# Patient Record
Sex: FEMALE | Race: WHITE
[De-identification: names, ages, dates, MRNs, and addresses within clinical notes are randomized per-mention and may not be internally consistent; named-entity substitution may affect disease eponyms.]

---

## 2019-07-10 ENCOUNTER — HOSPITAL ENCOUNTER (EMERGENCY)
Dept: HOSPITAL 91 - FTE | Age: 24
Discharge: HOME | End: 2019-07-10
Payer: MEDICAID

## 2019-07-10 ENCOUNTER — HOSPITAL ENCOUNTER (EMERGENCY)
Dept: HOSPITAL 10 - FTE | Age: 24
Discharge: HOME | End: 2019-07-10
Payer: MEDICAID

## 2019-07-10 VITALS
HEIGHT: 68 IN | WEIGHT: 284.4 LBS | BODY MASS INDEX: 43.1 KG/M2 | HEIGHT: 68 IN | BODY MASS INDEX: 43.1 KG/M2 | WEIGHT: 284.4 LBS

## 2019-07-10 VITALS — SYSTOLIC BLOOD PRESSURE: 137 MMHG | RESPIRATION RATE: 18 BRPM | DIASTOLIC BLOOD PRESSURE: 85 MMHG | HEART RATE: 87 BPM

## 2019-07-10 DIAGNOSIS — R04.2: Primary | ICD-10-CM

## 2019-07-10 DIAGNOSIS — J03.90: ICD-10-CM

## 2019-07-10 PROCEDURE — 99283 EMERGENCY DEPT VISIT LOW MDM: CPT

## 2019-07-10 PROCEDURE — 71046 X-RAY EXAM CHEST 2 VIEWS: CPT

## 2019-07-10 PROCEDURE — 81025 URINE PREGNANCY TEST: CPT

## 2019-07-10 RX ADMIN — ACETAMINOPHEN 1 MG: 325 TABLET, FILM COATED ORAL at 04:33

## 2019-07-10 NOTE — ERD
ER Documentation


Chief Complaint


Chief Complaint





COUGH/ ST X'S 3 DAYS; HEMOPTYSIS X'S 1 DAY





HPI


Patient is a 24 years old female with no known past medical history presenting 


to the clinic for persistent throat pain, cough, green sputum production, fever,


chills X 4 days.  Patient admits to new onset mild phthisis as of yesterday.  


Patient denies shortness of breath, difficulty breathing, and chest pain.  


Patient admits to taking over-the-counter Tylenol with mild resolution of 


symptoms.  Patient denies any recent weight loss.





ROS


All systems reviewed and are negative except as per history of present illness.





Medications


Home Meds


Active Scripts


Dextromethorphan Hb-Promethazine Hcl* (Promethazine DM* Syrup) 473 Ml Syrup, 5 


ML PO Q6 PRN for COUGH for 7 Days, ML


   Prov:CHRIS BINGHAM PA-C         7/10/19


Amoxicillin/Potassium Clav (Amox-Clav 875-125 mg Tablet) 875-125 mg Tab, 1 TAB 


PO BID for 10 Days, #20 TAB


   Prov:CHRIS BINGHAM PA-C         7/10/19


Hydrocodone/Acetaminophen (Norco 5-325 Tablet) 1 Each Tablet, 1 TAB PO Q6H PRN 


for PAIN, #10 TAB


   Prov:MARTHA CORCORAN DO         3/16/19


Ibuprofen* (Motrin*) 600 Mg Tab, 600 MG PO Q6H PRN for PAIN AND OR ELEVATED 


TEMP, #30 TAB


   Prov:MARTHA CORCORAN DO         3/16/19


Docusate Sodium* (Colace*) 100 Mg Capsule, 100 MG PO TID, #30 CAP


   Prov:TONYA TOLLIVER PA-C         18


Ferrous Sulfate* (Ferrous Sulfate*) 325 Mg Tabec, 325 MG PO DAILY, #30 TAB


   Prov:TONYA TOLLIVER PA-C         18


Naproxen* (Naprosyn*) 500 Mg Tablet, 500 MG PO BID PRN for PAIN AND/OR 


INFLAMMATION, #30 TAB


   Prov:TONYA TOLLIVER PA-C         18


Norethindrone-E.estradiol-Iron (Loestrin Fe 1.5-30 Tablet) 1 Each Tablet, 1 EACH


PO DAILY, #30 TAB


   Prov:TONYA TOLLIVER PA-C         18





Allergies


Allergies:  


Coded Allergies:  


     No Known Allergy (Unverified , 3/16/19)





PMhx/Soc


History of Surgery:  Yes ()


Anesthesia Reaction:  No


Hx Neurological Disorder:  No


Hx Respiratory Disorders:  No


Hx Cardiac Disorders:  No


Hx Psychiatric Problems:  No


Hx Miscellaneous Medical Probl:  Yes (elevated platelets)


Hx Alcohol Use:  No


Hx Substance Use:  No


Hx Tobacco Use:  No





FmHx


Family History:  No diabetes, No coronary disease, No other





Physical Exam


Vitals





Vital Signs


  Date      Temp  Pulse  Resp  B/P (MAP)   Pulse Ox  O2          O2 Flow    FiO2


Time                                                 Delivery    Rate


   7/10/19  98.7     87    18      137/85       100


     03:55                          (102)





Physical Exam


Const:   No acute distress


Head:   Atraumatic 


Eyes:    Normal Conjunctiva


ENT:    Normal External Ears, Nose and Mouth.  Tonsillar erythema with lesions. 


No exudate or edema noted in oropharyngeal exam.  Tympanic membrane clear 


bilaterally.


Neck:               Full range of motion. No meningismus.


Resp:   Clear to auscultation bilaterally.  No rales, rhonchi, wheezing.


Cardio:   Regular rate and rhythm, no murmurs


Neur:   Awake and alert


Psych:    Normal Mood and Affect


Results 24 hrs





Laboratory Tests


                    Test
                      7/10/19
04:37


                    POC Beta HCG, Qualitative  NEGATIVE





Current Medications


 Medications
   Dose
          Sig/Dre
       Start Time
   Status  Last


 (Trade)       Ordered        Route
 PRN     Stop Time              Admin
Dose


                              Reason                                Admin


                650 mg         ONCE  ONCE
    7/10/19       DC           7/10/19


Acetaminophen                 PO
            04:30
                       04:33




  (Tylenol                                  7/10/19 04:31


Tab)








Procedures/MDM


Patient was seen and evaluated for throat pain, cough, hemoptysis.  Patient is 


experiencing tonsillitis without complication.  Chest x-ray revealed No evidence


of acute cardiopulmonary disease.  Patient was given Tylenol in ED.





Patient is stable and ready for discharge.  Follow-up with PCP.  Patient will be


discharged with Augmentin for 10 days and Promethazine DM.





Departure


Diagnosis:  


   Primary Impression:  


   Hemoptysis


   Additional Impression:  


   Tonsillitis


Condition:  Stable


Patient Instructions:  When Your Child Has Pharyngitis or Tonsillitis , 


Hemoptysis


Referrals:  


[facility]





Additional Instructions:  


Paciente aconseja volver a Departamento de urgencias inmediatamente para 


sntomas nuevos o que empeoran . Paciente aconseja posteriores con el PCP en 2-3


luke . Paciente verbaliza la comprehensin y est de acuerdo con el tratamiento 


y el curso de accin.














Si el paciente no tiene ninguna de atencin primaria pueden seguir con





























[facility]





[address] [place] [address]














o














[address] [address]











CHRIS BINGHAM PA-C               Jul 10, 2019 04:19